# Patient Record
Sex: MALE | Race: BLACK OR AFRICAN AMERICAN | Employment: FULL TIME | URBAN - METROPOLITAN AREA
[De-identification: names, ages, dates, MRNs, and addresses within clinical notes are randomized per-mention and may not be internally consistent; named-entity substitution may affect disease eponyms.]

---

## 2019-06-25 ENCOUNTER — HOSPITAL ENCOUNTER (EMERGENCY)
Age: 34
Discharge: HOME | End: 2019-06-26
Attending: EMERGENCY MEDICINE | Admitting: EMERGENCY MEDICINE
Payer: SELF-PAY

## 2019-06-25 DIAGNOSIS — F07.81 POST CONCUSSION SYNDROME: Primary | ICD-10-CM

## 2019-06-25 PROCEDURE — 99283 EMERGENCY DEPT VISIT LOW MDM: CPT | Performed by: EMERGENCY MEDICINE

## 2019-06-26 ENCOUNTER — APPOINTMENT (OUTPATIENT)
Dept: CT IMAGING | Age: 34
End: 2019-06-26
Attending: EMERGENCY MEDICINE
Payer: SELF-PAY

## 2019-06-26 VITALS
OXYGEN SATURATION: 100 % | DIASTOLIC BLOOD PRESSURE: 62 MMHG | BODY MASS INDEX: 18.19 KG/M2 | WEIGHT: 120 LBS | SYSTOLIC BLOOD PRESSURE: 122 MMHG | HEIGHT: 68 IN | TEMPERATURE: 98.3 F | HEART RATE: 64 BPM | RESPIRATION RATE: 18 BRPM

## 2019-06-26 PROCEDURE — 70450 CT HEAD/BRAIN W/O DYE: CPT

## 2019-06-26 RX ORDER — ONDANSETRON 4 MG/1
4 TABLET, ORALLY DISINTEGRATING ORAL
Qty: 12 TAB | Refills: 0 | Status: SHIPPED | OUTPATIENT
Start: 2019-06-25

## 2019-06-26 RX ORDER — BUTALBITAL, ASPIRIN, AND CAFFEINE 325; 50; 40 MG/1; MG/1; MG/1
1 CAPSULE ORAL
Qty: 10 CAP | Refills: 0 | Status: SHIPPED | OUTPATIENT
Start: 2019-06-26 | End: 2019-09-24

## 2019-06-26 NOTE — DISCHARGE INSTRUCTIONS
Use the prescription medication for nausea. You can buy meclizine over-the-counter if you continue to have dizziness. your symptoms will wax and wane but the bottom line is to avoid activities that cause her symptoms to worsen. If you continue to have symptoms for more than a month to follow-up with a neurologist but right now it is not necessary.

## 2019-06-26 NOTE — ED NOTES
I have reviewed discharge instructions with the patient. The patient verbalized understanding. Patient left ED via Discharge Method: ambulatory to Home with self. Opportunity for questions and clarification provided. Patient given 2 scripts. To continue your aftercare when you leave the hospital, you may receive an automated call from our care team to check in on how you are doing. This is a free service and part of our promise to provide the best care and service to meet your aftercare needs.  If you have questions, or wish to unsubscribe from this service please call [phone]. Thank you for Choosing our [address] [place] [address] Emergency Department.      \

## 2019-06-26 NOTE — ED PROVIDER NOTES
72-year-old male presenting for evaluation of her head injury. States he fell last week Thursday evening. he climbed up a ladder slipped and fell about 7 or 8 feet. He struck his chin on the way down and hit his head on the ground. He felt okay over the next 24 hours she developed headache, spots in his vision and some nausea and one episode of vomiting. Sleeping well. His systolic had headaches especially when he gets back on the heat. 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 Right now he feels fine and states if he gets warm at all and especially since the weather is concerning human becomes nauseated his headache comes back and he feels like his balance is off. This is never happened to him before. He has not taken any medications for any other symptoms. The history is provided by the patient. Head Injury    The incident occurred more than 2 days ago. He came to the ER via walk-in. The injury mechanism was a direct blow. The volume of blood lost was none. The quality of the pain is described as dull and throbbing. The pain is at a severity of 3/10. The pain is mild. The pain has been fluctuating since the injury. Associated symptoms include vomiting and disorientation. Pertinent negatives include no numbness, no blurred vision, no tinnitus, no weakness and no memory loss. He has tried nothing for the symptoms. The treatment provided no relief. There was no loss of consciousness. He has been sleeping poorly. History reviewed. No pertinent past medical history. History reviewed. No pertinent surgical history. History reviewed. No pertinent family history.     Social History     Socioeconomic History    Marital status: SINGLE     Spouse name: Not on file    Number of children: Not on file    Years of education: Not on file    Highest education level: Not on file   Occupational History    Not on file   Social Needs    Financial resource strain: Not on file    Food insecurity:     Worry: Not on file     Inability: Not on file    Transportation needs:     Medical: Not on file     Non-medical: Not on file   Tobacco Use    Smoking status: Current Every Day Smoker    Smokeless tobacco: Never Used   Substance and Sexual Activity    Alcohol use: Yes    Drug use: Not Currently    Sexual activity: Not on file   Lifestyle    Physical activity:     Days per week: Not on file     Minutes per session: Not on file    Stress: Not on file   Relationships    Social connections:     Talks on phone: Not on file     Gets together: Not on file     Attends Sikhism service: Not on file     Active member of club or organization: Not on file     Attends meetings of clubs or organizations: Not on file     Relationship status: Not on file    Intimate partner violence:     Fear of current or ex partner: Not on file     Emotionally abused: Not on file     Physically abused: Not on file     Forced sexual activity: Not on file   Other Topics Concern    Not on file   Social History Narrative    Not on file         ALLERGIES: Patient has no allergy information on record. Review of Systems   HENT: Negative for tinnitus. Eyes: Positive for visual disturbance. Negative for blurred vision. Gastrointestinal: Positive for nausea and vomiting. Neurological: Positive for light-headedness and headaches. Negative for weakness and numbness. Psychiatric/Behavioral: Negative for memory loss. All other systems reviewed and are negative. Vitals:    06/25/19 2325   BP: 122/64   Pulse: 70   Resp: 15   Temp: 99 °F (37.2 °C)   SpO2: 100%   Weight: 54.4 kg (120 lb)   Height: 5' 8\" (1.727 m)            Physical Exam   Constitutional: He is oriented to person, place, and time. He appears well-developed and well-nourished. HENT:   Head: Normocephalic and atraumatic.    Eyes: Pupils are equal, round, and reactive to light. Conjunctivae and EOM are normal.   Neck: Normal range of motion. Neck supple. Cardiovascular: Normal rate, regular rhythm, normal heart sounds and intact distal pulses. Pulmonary/Chest: Effort normal and breath sounds normal.   Abdominal: Soft. Bowel sounds are normal.   Musculoskeletal: Normal range of motion. He exhibits no deformity. Neurological: He is alert and oriented to person, place, and time. No cranial nerve deficit. Skin: Skin is warm and dry. Psychiatric: He has a normal mood and affect. His behavior is normal.   Nursing note and vitals reviewed. MDM  Number of Diagnoses or Management Options  Post concussion syndrome:   Diagnosis management comments: 58-year-old male presenting for evaluation of head injury sounds like a postconcussive syndrome. Physical exam is reassuring at this time. We will get a head CT is ordered determine at least follow-up necessities       Amount and/or Complexity of Data Reviewed  Tests in the radiology section of CPT®: ordered and reviewed (Ct Head Wo Cont    Result Date: 6/26/2019  CT HEAD WITHOUT CONTRAST HISTORY:  Head trauma. COMPARISON: None. TECHNIQUE: Axial imaging was performed without intravenous contrast utilizing 5mm slice thickness. Sagittal and coronal reformats were performed. Radiation dose reduction techniques were used for this study. Our CT scanner uses one or all of the following: Automated exposure control, adjustment of the MAS or KUB according to patient's size and iterative reconstruction. FINDINGS:    *BRAIN:    -  There are no early signs of territorial or lacunar infarction by CT.    -  No intracranial mass, hematoma, or hydrocephalus.    -  No gross white matter abnormality by CT. *VISUALIZED PARANASAL SINUSES: Well aerated. *MASTOIDS:  Clear. *CALVARIUM AND SCALP: Unremarkable.      IMPRESSION: Unremarkable brain CT without intravenous contrast. Date of Dictation: 6/26/2019 12:13 AM )  Independent visualization of images, tracings, or specimens: yes    Risk of Complications, Morbidity, and/or Mortality  Presenting problems: moderate  Diagnostic procedures: moderate  Management options: moderate  General comments: I personally reviewed the patient's vital signs, laboratory tests, and/or radiological findings. I discussed these findings with the patient and their significance. I answered all questions and gave the patient clear return precautions.   The patient was discharged from the emergency department in stable condition        Patient Progress  Patient progress: improved    ED Course as of Jun 26 0018   Wed Jun 26, 2019   0011 Personally reviewed the patient's head CT I see no acute process    [JS]      ED Course User Index  [JS] Ángel Lay MD       Procedures

## 2019-06-26 NOTE — ED TRIAGE NOTES
Pt to er c/o hitting his chin on Thursday after falling off a ladder 7-8 foot approx, sts Friday he had a headache and sts he has had a headache since Friday, sts he vomited Friday. Pt sts he continues to have a headache tonight. Pt sts he drove himself to the er.

## 2025-01-16 ENCOUNTER — OFFICE VISIT (OUTPATIENT)
Age: 40
End: 2025-01-16

## 2025-01-16 VITALS
BODY MASS INDEX: 17.77 KG/M2 | WEIGHT: 120 LBS | OXYGEN SATURATION: 98 % | TEMPERATURE: 98.4 F | DIASTOLIC BLOOD PRESSURE: 62 MMHG | SYSTOLIC BLOOD PRESSURE: 92 MMHG | HEIGHT: 69 IN | RESPIRATION RATE: 16 BRPM | HEART RATE: 68 BPM

## 2025-01-16 DIAGNOSIS — Z01.10 ENCOUNTER FOR HEARING EXAMINATION WITHOUT ABNORMAL FINDINGS: ICD-10-CM

## 2025-01-16 DIAGNOSIS — Z02.1 PHYSICAL EXAM, PRE-EMPLOYMENT: Primary | ICD-10-CM

## 2025-01-16 DIAGNOSIS — R68.3 CLUBBED FINGERS: ICD-10-CM

## 2025-01-16 DIAGNOSIS — Z02.1 PRE-EMPLOYMENT DRUG TESTING: ICD-10-CM

## 2025-01-16 DIAGNOSIS — R01.1 HEART MURMUR: ICD-10-CM

## 2025-01-16 ASSESSMENT — VISUAL ACUITY
OS_CC: 20/20
OU: 1
OD_CC: 20/20

## 2025-01-16 ASSESSMENT — ENCOUNTER SYMPTOMS
EYE REDNESS: 0
NAUSEA: 0
BACK PAIN: 0
SORE THROAT: 0
ABDOMINAL PAIN: 0
PHOTOPHOBIA: 0
EYE DISCHARGE: 0
CHEST TIGHTNESS: 0
EYE PAIN: 0
DIARRHEA: 0
VOMITING: 0
EYE ITCHING: 0
CONSTIPATION: 0
SHORTNESS OF BREATH: 0
RHINORRHEA: 0
COUGH: 0

## 2025-02-24 ENCOUNTER — OFFICE VISIT (OUTPATIENT)
Age: 40
End: 2025-02-24

## 2025-02-24 DIAGNOSIS — Z00.00 PREVENTATIVE HEALTH CARE: ICD-10-CM

## 2025-02-24 DIAGNOSIS — R01.1 HEART MURMUR: Primary | ICD-10-CM

## 2025-02-24 DIAGNOSIS — Z02.1 PHYSICAL EXAM, PRE-EMPLOYMENT: ICD-10-CM

## 2025-02-24 DIAGNOSIS — R68.3 CLUBBED FINGERS: ICD-10-CM

## 2025-02-24 NOTE — PROGRESS NOTES
[facility]  ONSITE CLINIC  PROGRESS NOTE    SUBJECTIVE     Cayden Rivers is a 39 y.o. male seen for:    Chief Complaint    Referral - General         Patient presents to [facility] for referrals for a cardiologist as he needs cardiac clearance within 90 days of starting work at [employer] due to a heart murmur, clubbed fingers, and low BP found on pre-employment physical on 1/16/25. (Patient was asymptomatic at the time of the exam and reported no chest pain, shortness of breath, lightheadedness, dizziness, or heart palpitations at rest or with exertion.) Patient now has health insurance and is requesting a referral to both a cardiologist and primary care. Patient is feeling well today and reports no complaints.       History provided by:  Patient   used: No          No current outpatient medications on file.     No current facility-administered medications for this visit.      No Known Allergies  Past Medical History:   Diagnosis Date    Allergic rhinitis     Headache     Heart murmur     since childhood; reports not having an ECHO or workup    Right ankle fracture 2019    No surgery; wore a cast    right third metacarpal shaft fracture 2019    Followed up with orthopedics     No past surgical history on file.    Social History     Tobacco Use    Smoking status: Every Day     Current packs/day: 1.00     Average packs/day: 1 pack/day for 16.1 years (16.1 ttl pk-yrs)     Types: Cigarettes, Cigars     Start date: 2009    Smokeless tobacco: Never    Tobacco comments:     Used to smoke cigarettes from [age] years of age to 36 (1PPD); switched to Black and Mild cigars in 2021   Vaping Use    Vaping status: Never Used   Substance Use Topics    Alcohol use: Yes     Alcohol/week: 1.0 standard drink of alcohol     Types: 1 Shots of liquor per week     Comment: 1 drink containing liquor a month    Drug use: Not Currently        No family history on file.    OBJECTIVE    There

## 2025-03-28 ENCOUNTER — INITIAL CONSULT (OUTPATIENT)
Age: 40
End: 2025-03-28

## 2025-03-28 VITALS
SYSTOLIC BLOOD PRESSURE: 138 MMHG | BODY MASS INDEX: 17.33 KG/M2 | HEIGHT: 69 IN | HEART RATE: 76 BPM | WEIGHT: 117 LBS | DIASTOLIC BLOOD PRESSURE: 72 MMHG

## 2025-03-28 DIAGNOSIS — R01.1 HEART MURMUR: ICD-10-CM

## 2025-03-28 DIAGNOSIS — Z02.1 PRE-EMPLOYMENT EXAMINATION: Primary | ICD-10-CM

## 2025-03-28 DIAGNOSIS — F17.298 OTHER TOBACCO PRODUCT NICOTINE DEPENDENCE WITH OTHER NICOTINE-INDUCED DISORDER: Chronic | ICD-10-CM

## 2025-03-28 RX ORDER — VARENICLINE TARTRATE 1 MG/1
1 TABLET, FILM COATED ORAL 2 TIMES DAILY
Qty: 60 TABLET | Refills: 2 | Status: SHIPPED | OUTPATIENT
Start: 2025-03-28 | End: 2025-03-28

## 2025-03-28 RX ORDER — VARENICLINE TARTRATE 0.5 MG/1
.5-1 TABLET, FILM COATED ORAL SEE ADMIN INSTRUCTIONS
Qty: 57 TABLET | Refills: 0 | Status: SHIPPED | OUTPATIENT
Start: 2025-03-28

## 2025-03-28 RX ORDER — VARENICLINE TARTRATE 0.5 MG/1
.5-1 TABLET, FILM COATED ORAL SEE ADMIN INSTRUCTIONS
Qty: 57 TABLET | Refills: 0 | Status: SHIPPED | OUTPATIENT
Start: 2025-03-28 | End: 2025-03-28

## 2025-03-28 RX ORDER — VARENICLINE TARTRATE 1 MG/1
1 TABLET, FILM COATED ORAL 2 TIMES DAILY
Qty: 60 TABLET | Refills: 2 | Status: SHIPPED | OUTPATIENT
Start: 2025-03-28

## 2025-03-28 ASSESSMENT — ENCOUNTER SYMPTOMS: SHORTNESS OF BREATH: 0

## 2025-03-28 NOTE — PROGRESS NOTES
[address] [facility], [place] [address]  PHONE: [phone]    SUBJECTIVE:   Cayden Rivers is a 40 y.o. male 1985   seen for a consultation visit regarding the following:     Chief Complaint   Patient presents with    Cardiac Clearance              Consultation is requested for evaluation of Cardiac Clearance   .    History of present illness: 40 y.o. male with no significant PMH presenting for pre-employment evaluation. Will be working for [employer]. During his pre-employment evaluation he was noted with a heart murmur and clubbed fingers. Patient smokes 5 cigars daily. He feels well overall. Denies chest pain, dyspnea or activity limitation. No FH heart disease.    Reviewed occupational health clnic note 2/24/25    Past Medical History, Past Surgical History, Family history, Social History, and Medications were all reviewed with the patient today and updated as necessary.       No Known Allergies  Past Medical History:   Diagnosis Date    Allergic rhinitis     Headache     Heart murmur     since childhood; reports not having an ECHO or workup    Right ankle fracture 2019    No surgery; wore a cast    right third metacarpal shaft fracture 2019    Followed up with orthopedics     History reviewed. No pertinent surgical history.  History reviewed. No pertinent family history.  Social History     Tobacco Use    Smoking status: Every Day     Current packs/day: 1.00     Average packs/day: 1 pack/day for 16.2 years (16.2 ttl pk-yrs)     Types: Cigarettes, Cigars     Start date: 2009    Smokeless tobacco: Never    Tobacco comments:     Used to smoke cigarettes from [age] years of age to 36 (1PPD); switched to Black and Mild cigars in 2021   Substance Use Topics    Alcohol use: Yes     Alcohol/week: 1.0 standard drink of alcohol     Types: 1 Shots of liquor per week     Comment: 1 drink containing liquor a month       ROS:    Review of Systems   Cardiovascular:  Negative for